# Patient Record
Sex: FEMALE | Race: WHITE | Employment: FULL TIME | URBAN - METROPOLITAN AREA
[De-identification: names, ages, dates, MRNs, and addresses within clinical notes are randomized per-mention and may not be internally consistent; named-entity substitution may affect disease eponyms.]

---

## 2022-07-13 ENCOUNTER — HOSPITAL ENCOUNTER (OUTPATIENT)
Dept: PHYSICAL THERAPY | Age: 67
Discharge: HOME | End: 2022-07-13
Payer: MEDICARE

## 2022-07-13 PROCEDURE — 97161 PT EVAL LOW COMPLEX 20 MIN: CPT

## 2022-07-13 NOTE — PROGRESS NOTES
PHYSICAL THERAPY - DAILY TREATMENT NOTE      Patient Name: Lisa Pena        Date: 2022  : 1955   YES Patient  Verified  Visit #:     Insurance: Payor: Nancy Robbind / Plan: [address] [place] [address] HMO / Product Type: Managed Care Medicare /      In time: 11:45 Out time: 12:25   Total Treatment Time: 40     Medicare Time/BCBS Tracking (below)   Total Timed Codes (min):  0 1:1 Treatment Time:  0     TREATMENT AREA = Other low back pain [M54.59]     SUBJECTIVE    Pain Level (on 0 to 10 scale):  8  / 10   Medication Changes/New allergies or changes in medical history, any new surgeries or procedures? NO    If yes, update Summary List   Subjective Functional Status/Changes:  []  No changes reported       See Plan of Care       OBJECTIVE    Other Objective/Functional Measures:    See Plan of Care     Post Treatment Pain Level (on 0 to 10) scale:   8   10     ASSESSMENT    Assessment/Changes in Function:     See Plan of Care     []  See Progress Note/Recertification   Patient will continue to benefit from skilled PT services to modify and progress therapeutic interventions, address functional mobility deficits, address ROM deficits, address strength deficits, analyze and address soft tissue restrictions, analyze and cue movement patterns, analyze and modify body mechanics/ergonomics and assess and modify postural abnormalities to attain remaining goals. to attain remaining goals.    Progress toward goals / Updated goals:    See Plan of Care     PLAN    [x]  Upgrade activities as tolerated YES Continue plan of care   []  Discharge due to :    []  Other:      Therapist: Callum Easley PT    Date: 2022 Time: 12:46 PM     Future Appointments   Date Time Provider Rik Hill   2022 11:45 AM Mir Tamayo,  [facility]   2022 12:15 PM Mir Tamayo,  [facility]   2022 11:45 AM Mir Tamayo,  [facility]   2022 12:45 PM Mir Tamayo,  [facility]

## 2022-07-13 NOTE — PROGRESS NOTES
[address],[facility], [address] - Phone: [phone]  Fax: [phone] / [facility]  Patient Name: Kaila Simmons : 1955   Medical   Diagnosis: Other low back pain [M54.59] Treatment Diagnosis: LBP   Onset Date:      Referral Source: Dane Paul NP Start of Care [facility]): 2022   Prior Hospitalization: See medical history Provider #: [id number]   Prior Level of Function: Hx chronic LBP   Comorbidities: Depression, OA   Medications: Verified on Patient Summary List   The Plan of Care and following information is based on the information from the initial evaluation.   ===========================================================================================  Assessment / nunn information:  Kaila Simmons is a 78 y.o.  yo female with Dx of Other low back pain [M54.59]. Patient reports increased symptoms about one year ago with history of chronic back pain. Patient reporting increased pain and right LE radicular pain over the last week with need for [facility] use to assist in ambulation. Patient arrived to clinic with self-prescribed back brace. Reported declining MD recommendation of lumbar GUERITA last year. Patient currently rates pain as 8/10 at worst, 8/10 at best, primarily located at lumbar and right LE. Patient complains of difficulty and increase pain with standing, walking and bending/lifting. 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 Objective Findings:  Lumbar AROM: flexion decreased 50%, extension decreased 50%, lateral flexion right decreased 50%, left decreased 50%, rotation right decreased 50%, left decreased 50% with pain at end range all planes. Manual Muscle Testin-/5 right hip abduction and bilateral hip extension.   Special Test: signs and symptoms consistent with spinal nerve compression. Scoliotic curvature and poor spinal/pelvic alignment noted. Poor posture and core engagement noted. FOTO Score= 53 points. Patient educated in activity modification suggestions to avoid symptom exacerbation. Patient will benefit from continued skilled services to further improve functional limitations.  ===========================================================================================  Eval Complexity: History HIGH Complexity :3+ comorbidities / personal factors will impact the outcome/ POC ;  Examination  HIGH Complexity : 4+ Standardized tests and measures addressing body structure, function, activity limitation and / or participation in recreation ; Presentation LOW Complexity : Stable, uncomplicated ;  Decision Making MEDIUM Complexity : FOTO score of 26-74; Overall Complexity LOW   Problem List: pain affecting function, decrease ROM, decrease strength, impaired gait/ balance, decrease ADL/ functional abilitiies, decrease activity tolerance and decrease flexibility/ joint mobility   Treatment Plan may include any combination of the following: Therapeutic exercise, Therapeutic activities, Neuromuscular re-education, Physical agent/modality, Manual therapy and Patient education  Patient / Family readiness to learn indicated by: asking questions, trying to perform skills and interest  Persons(s) to be included in education: patient (P)  Barriers to Learning/Limitations: None  Measures taken, if barriers to learning:    Patient Goal (s): \"Make my pain more manageable. \"   Patient self reported health status: fair  Rehabilitation Potential: good   Short Term Goals: To be accomplished in  2  weeks:  1. Patient will increase FOTO Score to 56 points to improve tolerance to ADLs. 2. Patient will achieve +2 on GROC to improve tolerance to ADLs. 3. Patient will report 5/10 pain at worst to improve tolerance to ADLs.  Long Term Goals:  To be accomplished in  4 weeks: 1. Patient will increase FOTO Score to 59 points to improve tolerance to ADLs. 2. Patient will achieve +4 on GROC to improve tolerance to ADLs. 3. Patient will report 3/10 pain at worst to improve tolerance to ADLs. Frequency / Duration:   Patient to be seen  1-2  times per week for 4  weeks:  Patient / Caregiver education and instruction: activity modification  Therapist Signature: Scar Mackey PT Date: 2/86/3063   Certification Period: 7/13/22-10/12/22 Time: 12:46 PM   ===========================================================================================  I certify that the above Physical Therapy Services are being furnished while the patient is under my care. I agree with the treatment plan and certify that this therapy is necessary. Physician Signature:        Date:       Time:                                        Sweta Hartley NP  Please sign and return to [facility], [facility]. or you may fax the signed copy to [phone]. Thank you.

## 2022-07-20 ENCOUNTER — APPOINTMENT (OUTPATIENT)
Dept: PHYSICAL THERAPY | Age: 67
End: 2022-07-20
Payer: MEDICARE

## 2022-07-29 ENCOUNTER — HOSPITAL ENCOUNTER (OUTPATIENT)
Dept: PHYSICAL THERAPY | Age: 67
Discharge: HOME | End: 2022-07-29
Payer: MEDICARE

## 2022-07-29 PROCEDURE — 97140 MANUAL THERAPY 1/> REGIONS: CPT

## 2022-07-29 PROCEDURE — 97110 THERAPEUTIC EXERCISES: CPT

## 2022-07-29 PROCEDURE — 97530 THERAPEUTIC ACTIVITIES: CPT

## 2022-07-29 NOTE — PROGRESS NOTES
PHYSICAL THERAPY - DAILY TREATMENT NOTE      Patient Name: Andria Gutierrez        Date: 2022  : 1955   YES Patient  Verified  Visit #:     Insurance: Payor: Christine Natasha / Plan: [address] [place] [address] HMO / Product Type: Managed Care Medicare /      In time: 12:20 Out time: 1:10   Total Treatment Time: 50     Medicare/BCBS Time Tracking (below)   Total Timed Codes (min):  40 1:1 Treatment Time:  40     TREATMENT AREA =  Other low back pain [M54.59]    SUBJECTIVE    Pain Level (on 0 to 10 scale):  5  / 10   Medication Changes/New allergies or changes in medical history, any new surgeries or procedures?     NO    If yes, update Summary List   Subjective Functional Status/Changes:  []  No changes reported       Functional improvements: none reported  Functional impairments: left LE radicular with ADLs         OBJECTIVE  Modalities Rationale:     decrease pain to improve patient's ability to perform ADLs   min [] Estim, type/location:                                      []  att     []  unatt     []  w/US     []  w/ice    []  w/heat    min []  Mechanical Traction: type/lbs                   []  pro   []  sup   []  int   []  cont    []  before manual    []  after manual    min []  Ultrasound, settings/location:      min []  Iontophoresis w/ dexamethasone, location:                                               []  take home patch       []  in clinic   10 min []  Ice     [x]  Heat    location/position: Supine lumbar    min []  Vasopneumatic Device, press/temp:        min []  Other:    [x] Skin assessment post-treatment (if applicable):    []  intact    [x]  redness- no adverse reaction                  []redness - adverse reaction:      10 min Therapeutic Exercise:  [x]  See flow sheet   Rationale:      increase ROM and increase strength to improve the patients ability to perform ADLs     15 min Therapeutic Activity: Posture and lifting education   Rationale:      increase ROM and increase strength to improve the patients ability to perform ADLs     15 min Manual Therapy: Technique:      [] S/DTM []IASTM []PROM [] Passive Stretching   []manual TPR    []Jt manipulation:Gr I [] II []  III [] IV[] V[]  Treatment Area:  S/L Left lateral hip; supine bilateral psoas release; prone with pillows lumbar DTM bilateral and sacral flexion mobs   Rationale:      decrease pain, increase ROM, and increase tissue extensibility to improve patient's ability to perform ADLs    Billed With/As:   [] TE   [] TA   [] Neuro   [] Self Care Patient Education: [x] Review HEP    [] Progressed/Changed HEP based on:   [] positioning   [] body mechanics   [] transfers   [] heat/ice application    [] other:      Other Objective/Functional Measures:    Reviewed posture, body mechanics and lifting techniques to reduce load on spine. Post Treatment Pain Level (on 0 to 10) scale:   5  / 10     ASSESSMENT    Assessment/Changes in Function:     Patient encouraged to initiate HEP of prone with pillows GS to reduce radiculopathy during ADLs. []  See Progress Note/Recertification   Patient will continue to benefit from skilled PT services to modify and progress therapeutic interventions, address functional mobility deficits, address ROM deficits, address strength deficits, analyze and address soft tissue restrictions, analyze and cue movement patterns, analyze and modify body mechanics/ergonomics, and assess and modify postural abnormalities to attain remaining goals. to attain remaining goals. Progress toward goals / Updated goals:    Slow Progress to    [] STG    [x] LTG  3 as shown by pain 5/10.      PLAN    [x]  Upgrade activities as tolerated YES Continue plan of care   []  Discharge due to :    []  Other:      Therapist: Carlos Barrios PT    Date: 7/29/2022 Time: 1:05 PM     Future Appointments   Date Time Provider Rik Hill   8/3/2022 11:45 AM Arun Gtz PT [facility]   8/22/2022 12:45 PM Arun Gtz, PT [facility]

## 2022-08-01 ENCOUNTER — APPOINTMENT (OUTPATIENT)
Dept: PHYSICAL THERAPY | Age: 67
End: 2022-08-01
Payer: MEDICARE

## 2022-08-03 ENCOUNTER — TELEPHONE (OUTPATIENT)
Dept: PHYSICAL THERAPY | Age: 67
End: 2022-08-03

## 2022-08-03 ENCOUNTER — APPOINTMENT (OUTPATIENT)
Dept: PHYSICAL THERAPY | Age: 67
End: 2022-08-03
Payer: MEDICARE

## 2022-08-22 ENCOUNTER — HOSPITAL ENCOUNTER (OUTPATIENT)
Dept: PHYSICAL THERAPY | Age: 67
Discharge: HOME | End: 2022-08-22
Payer: MEDICARE

## 2022-08-22 PROCEDURE — 97140 MANUAL THERAPY 1/> REGIONS: CPT

## 2022-08-22 PROCEDURE — 97110 THERAPEUTIC EXERCISES: CPT

## 2022-08-22 NOTE — PROGRESS NOTES
[address],[place], [address] [facility] - Phone: [phone]  Fax: [phone] OF CARE/RECERTIFICATION FOR PHYSICAL THERAPY          Patient Name: Carolina Patel : 1955   Treatment/Medical Diagnosis: Other low back pain [M54.59]   Onset Date:     Referral Source: Jean Patel NP Start of Care [facility] [place]): 22   Prior Hospitalization: See Medical History Provider #: [id number]   Prior Level of Function: Hx chronic LBP   Comorbidities: Depression, OA   Medications: Verified on Patient Summary List   Visits from [facility]: 3 Missed Visits: 3     Goal/Measure of Progress Goal Met? 1. Patient will increase FOTO Score to 59 points to improve tolerance to ADLs. Status at last Eval: 53 points Current Status: Not assessed Not assessed   2. Patient will achieve +4 on GROC to improve tolerance to ADLs. Status at last Eval: N/A Current Status: GROC 0 no   3. Patient will report 3/10 pain at worst to improve tolerance to ADLs. Status at last Eval: Pain 8/10 at worst Current Status: Pain 8/10 at worst no     Key Functional Changes/Progress: Over last 3 session, patient has been educated in proper body mechanics and lifting strategies to reduce pain and patient reported some carry through at home. Patient reports continuing to take pain medication to manage symptoms. Patient states no significant improvement noted, however only attended initial eval and 2 follow up sessions. Plan to continue to address soft tissue increased tone, poor joint mobility and alignment and progress therex to improve spinal mobility and stability.   Problem List: pain affecting function, decrease ROM, decrease strength, decrease ADL/ functional abilitiies, decrease activity tolerance, and decrease flexibility/ joint mobility   Treatment Plan may include any combination of the following: Therapeutic exercise, Therapeutic activities, Neuromuscular re-education, Physical agent/modality, Manual therapy, and Patient education  Patient Goal(s) has been updated and includes:     Goals for this certification period include and are to be achieved in   4  weeks:  1. Patient will increase FOTO Score to 59 points to improve tolerance to ADLs. 2. Patient will achieve +4 on GROC to improve tolerance to ADLs. 3. Patient will report 3/10 pain at worst to improve tolerance to ADLs. Frequency / Duration:   Patient to be seen   2   times per week for   4    weeks:    Assessments/Recommendations: Patient would benefit from continued PT services to further improve tolerance to ADLs and as care providers. If you have any questions/comments please contact us directly at [phone]. Thank you for allowing us to assist in the care of your patient. Therapist Signature: Higinio Cuellar PT Date: 7/03/8671   Certification Period:  Reporting Period: 7/13/22-10/12/22  7/13/22-8/22/22 Time: 12:57 PM   NOTE TO PHYSICIAN:  PLEASE COMPLETE THE ORDERS BELOW AND FAX TO   [facility]: [phone]  If you are unable to process this request in 24 hours please contact our office: [phone]    ___ I have read the above report and request that my patient continue as recommended.   ___ I have read the above report and request that my patient continue therapy with the following changes/special instructions: ________________________________________________   ___ I have read the above report and request that my patient be discharged from therapy.      Physician Signature:        Date:       Time:                                       Melba Logan NP

## 2022-08-22 NOTE — PROGRESS NOTES
Note, I also left message for Adriane QUIÑONES to change date from 8/20 to 9/10   PHYSICAL THERAPY - DAILY TREATMENT NOTE      Patient Name: Leonardo Loza        Date: 2022  : 1955   YES Patient  Verified  Visit #:   3   of   12  Insurance: Payor: Amanda Knight / Plan: [address] [place] [address] HMO / Product Type: Managed Care Medicare /      In time: 12:55 Out time: 1:45   Total Treatment Time: 50     Medicare/BCBS Time Tracking (below)   Total Timed Codes (min):  50 1:1 Treatment Time:  50     TREATMENT AREA =  Other low back pain [M54.59]    SUBJECTIVE    Pain Level (on 0 to 10 scale):  6  / 10   Medication Changes/New allergies or changes in medical history, any new surgeries or procedures?     NO    If yes, update Summary List   Subjective Functional Status/Changes:  []  No changes reported       See Recert         OBJECTIVE    25 min Therapeutic Exercise:  [x]  See flow sheet   Rationale:      increase ROM and increase strength to improve the patients ability to perform ADLs     25 min Manual Therapy: Technique:      [] S/DTM []IASTM []PROM [] Passive Stretching   []manual TPR    []Jt manipulation:Gr I [] II []  III [] IV[] V[]  Treatment Area:  S/L DTM lumbar and post/lat hip; prone DTM lumbar and sacral flexion mobs grade IV   Rationale:      decrease pain, increase ROM, and increase tissue extensibility to improve patient's ability to perform ADLs    Billed With/As:   [x] TE   [] TA   [] Neuro   [] Self Care Patient Education: [x] Review HEP    [] Progressed/Changed HEP based on:   [] positioning   [] body mechanics   [] transfers   [] heat/ice application    [] other:      Other Objective/Functional Measures:    See Recert     Post Treatment Pain Level (on 0 to 10) scale:   4  / 10     ASSESSMENT    Assessment/Changes in Function:     See Recert     []  See Progress Note/Recertification   Patient will continue to benefit from skilled PT services to modify and progress therapeutic interventions, address functional mobility deficits, address ROM deficits, address strength deficits, analyze and address soft tissue restrictions, analyze and cue movement patterns, analyze and modify body mechanics/ergonomics, and assess and modify postural abnormalities to attain remaining goals. to attain remaining goals.    Progress toward goals / Updated goals:    See Recert     PLAN    [x]  Upgrade activities as tolerated YES Continue plan of care   []  Discharge due to :    []  Other:      Therapist: Sarah Cisneros PT    Date: 8/22/2022 Time: 3:25 PM     Future Appointments   Date Time Provider Rik Hill   8/29/2022  1:30 PM [facility]

## 2022-08-29 ENCOUNTER — HOSPITAL ENCOUNTER (OUTPATIENT)
Dept: PHYSICAL THERAPY | Age: 67
Discharge: HOME | End: 2022-08-29
Payer: MEDICARE

## 2022-08-29 PROCEDURE — 97110 THERAPEUTIC EXERCISES: CPT

## 2022-08-29 PROCEDURE — 97140 MANUAL THERAPY 1/> REGIONS: CPT

## 2022-08-29 NOTE — PROGRESS NOTES
PHYSICAL THERAPY - DAILY TREATMENT NOTE      Patient Name: Reynaldo Rodriguez        Date: 2022  : 1955   YES Patient  Verified  Visit #:     Insurance: Payor: Chase Eastman / Plan: [address] [place] [address] HMO / Product Type: Managed Care Medicare /      In time: 1:35 Out time: 2:25   Total Treatment Time: 50     Medicare/BCBS Time Tracking (below)   Total Timed Codes (min):  50 1:1 Treatment Time:  50     TREATMENT AREA =  Other low back pain [M54.59]    SUBJECTIVE    Pain Level (on 0 to 10 scale):  6  / 10   Medication Changes/New allergies or changes in medical history, any new surgeries or procedures?     NO    If yes, update Summary List   Subjective Functional Status/Changes:  []  No changes reported       Functional improvements: less pain with ADLs  Functional impairments: bending/lifting         OBJECTIVE  Modalities Rationale:     decrease pain to improve patient's ability to perform ADLs   min [] Estim, type/location:                                      []  att     []  unatt     []  w/US     []  w/ice    []  w/heat    min []  Mechanical Traction: type/lbs                   []  pro   []  sup   []  int   []  cont    []  before manual    []  after manual    min []  Ultrasound, settings/location:      min []  Iontophoresis w/ dexamethasone, location:                                               []  take home patch       []  in clinic   10 min []  Ice     [x]  Heat    location/position: Supine lumbar    min []  Vasopneumatic Device, press/temp:        min []  Other:    [x] Skin assessment post-treatment (if applicable):    []  intact    [x]  redness- no adverse reaction                  []redness - adverse reaction:      25 min Therapeutic Exercise:  [x]  See flow sheet   Rationale:      increase ROM and increase strength to improve the patients ability to perform ADLs     15 min Manual Therapy: Technique:      [] S/DTM []IASTM []PROM [] Passive Stretching   []manual TPR    []Jt manipulation:Gr I [] II []  III [] IV[] V[]  Treatment Area:  DTM lumbar paraspinals, post/lat hip' prone TL junction release, sacral flexion mobs grade IV   Rationale:      decrease pain, increase ROM, and increase tissue extensibility to improve patient's ability to perform ADLs    Billed With/As:   [x] TE   [] TA   [] Neuro   [] Self Care Patient Education: [x] Review HEP    [] Progressed/Changed HEP based on:   [] positioning   [] body mechanics   [] transfers   [] heat/ice application    [] other:      Other Objective/Functional Measures:    Patient tolerating therex without exacerbation of symptoms. Post Treatment Pain Level (on 0 to 10) scale:   4  / 10     ASSESSMENT    Assessment/Changes in Function:     Patient continues to report intermittent left>right radiculopathy. []  See Progress Note/Recertification   Patient will continue to benefit from skilled PT services to modify and progress therapeutic interventions, address functional mobility deficits, address ROM deficits, address strength deficits, analyze and address soft tissue restrictions, analyze and cue movement patterns, and analyze and modify body mechanics/ergonomics to attain remaining goals. to attain remaining goals. Progress toward goals / Updated goals:    Fair Progress to    [] STG    [x] LTG  3 as shown by pain 6/10. PLAN    [x]  Upgrade activities as tolerated YES Continue plan of care   []  Discharge due to :    []  Other:      Therapist: Erica Alcala, PT    Date: 8/29/2022 Time: 2:00 PM   No future appointments.

## 2022-09-14 ENCOUNTER — HOSPITAL ENCOUNTER (OUTPATIENT)
Dept: PHYSICAL THERAPY | Age: 67
Discharge: HOME | End: 2022-09-14
Payer: MEDICARE

## 2022-09-14 PROCEDURE — 97110 THERAPEUTIC EXERCISES: CPT

## 2022-09-14 PROCEDURE — 97140 MANUAL THERAPY 1/> REGIONS: CPT

## 2022-09-14 NOTE — PROGRESS NOTES
PHYSICAL THERAPY - DAILY TREATMENT NOTE      Patient Name: Stu Capellan        Date: 2022  : 1955   YES Patient  Verified  Visit #:     Insurance: Payor: Emerson Proffer / Plan: [address] [place] [address] HMO / Product Type: Managed Care Medicare /      In time: 11:00 Out time: 11:50   Total Treatment Time: 50     Medicare/BCBS Time Tracking (below)   Total Timed Codes (min):  40 1:1 Treatment Time:  40     TREATMENT AREA =  Other low back pain [M54.59]    SUBJECTIVE    Pain Level (on 0 to 10 scale):  6  / 10   Medication Changes/New allergies or changes in medical history, any new surgeries or procedures?     NO    If yes, update Summary List   Subjective Functional Status/Changes:  []  No changes reported       Functional improvements: less pain with ADLs  Functional impairments: bending/lifting         OBJECTIVE  Modalities Rationale:     decrease pain to improve patient's ability to perform ADLs   min [] Estim, type/location:                                      []  att     []  unatt     []  w/US     []  w/ice    []  w/heat    min []  Mechanical Traction: type/lbs                   []  pro   []  sup   []  int   []  cont    []  before manual    []  after manual    min []  Ultrasound, settings/location:      min []  Iontophoresis w/ dexamethasone, location:                                               []  take home patch       []  in clinic   10 min []  Ice     [x]  Heat    location/position: Supine lumbar    min []  Vasopneumatic Device, press/temp:        min []  Other:    [x] Skin assessment post-treatment (if applicable):    []  intact    [x]  redness- no adverse reaction                  []redness - adverse reaction:      15 min Therapeutic Exercise:  [x]  See flow sheet   Rationale:      increase ROM and increase strength to improve the patients ability to perform ADLs     25 min Manual Therapy: Technique:      [] S/DTM []IASTM []PROM [] Passive Stretching   []manual TPR    []Jt manipulation:Gr I [] II []  III [] IV[] V[]  Treatment Area:  S/L DTM lumbar and post/lat hip; prone DTM lumbar and sacral flexion mobs grade III   Rationale:      decrease pain, increase ROM, and increase tissue extensibility to improve patient's ability to perform ADLs    Billed With/As:   [] TE   [] TA   [] Neuro   [] Self Care Patient Education: [x] Review HEP    [] Progressed/Changed HEP based on:   [] positioning   [] body mechanics   [] transfers   [] heat/ice application    [] other:      Other Objective/Functional Measures:    Patient tolerated therex progression without complaints of increased pain. Post Treatment Pain Level (on 0 to 10) scale:   4  / 10     ASSESSMENT    Assessment/Changes in Function:     Updated and reviewed HEP. Patient encouraged to increase compliance with daily home program and activity modification suggestions. []  See Progress Note/Recertification   Patient will continue to benefit from skilled PT services to modify and progress therapeutic interventions, address functional mobility deficits, address ROM deficits, address strength deficits, analyze and address soft tissue restrictions, analyze and cue movement patterns, analyze and modify body mechanics/ergonomics, and assess and modify postural abnormalities to attain remaining goals. to attain remaining goals. Progress toward goals / Updated goals:    Fair Progress to    [] STG    [x] LTG  3 as shown by pain 6/10.      PLAN    [x]  Upgrade activities as tolerated YES Continue plan of care   []  Discharge due to :    []  Other:      Therapist: Scar Mackey PT    Date: 9/14/2022 Time: 11:58 AM     Future Appointments   Date Time Provider Rik Hill   9/19/2022  1:30 PM Aimee Norman PT [facility]   9/26/2022 12:45 PM Aimee Norman PT [facility]

## 2022-09-19 ENCOUNTER — HOSPITAL ENCOUNTER (OUTPATIENT)
Dept: PHYSICAL THERAPY | Age: 67
Discharge: HOME | End: 2022-09-19
Payer: MEDICARE

## 2022-09-19 PROCEDURE — 97110 THERAPEUTIC EXERCISES: CPT

## 2022-09-19 PROCEDURE — 97140 MANUAL THERAPY 1/> REGIONS: CPT

## 2022-09-19 NOTE — PROGRESS NOTES
PHYSICAL THERAPY - DAILY TREATMENT NOTE      Patient Name: Wicho Turner        Date: 2022  : 1955   YES Patient  Verified  Visit #:     Insurance: Payor: Nanette Kat / Plan: [address] [place] [address] HMO / Product Type: Managed Care Medicare /      In time: 1:35 Out time: 2:25   Total Treatment Time: 50     Medicare/BCBS Time Tracking (below)   Total Timed Codes (min):  40 1:1 Treatment Time:  40     TREATMENT AREA =  Other low back pain [M54.59]    SUBJECTIVE    Pain Level (on 0 to 10 scale):  6  / 10   Medication Changes/New allergies or changes in medical history, any new surgeries or procedures?     NO    If yes, update Summary List   Subjective Functional Status/Changes:  []  No changes reported       See Recert         OBJECTIVE  Modalities Rationale:     decrease pain to improve patient's ability to perform ADLs   min [] Estim, type/location:                                      []  att     []  unatt     []  w/US     []  w/ice    []  w/heat    min []  Mechanical Traction: type/lbs                   []  pro   []  sup   []  int   []  cont    []  before manual    []  after manual    min []  Ultrasound, settings/location:      min []  Iontophoresis w/ dexamethasone, location:                                               []  take home patch       []  in clinic   10 min []  Ice     [x]  Heat    location/position: Supine lumbar    min []  Vasopneumatic Device, press/temp:        min []  Other:    [x] Skin assessment post-treatment (if applicable):    []  intact    [x]  redness- no adverse reaction                  []redness - adverse reaction:      25 min Therapeutic Exercise:  [x]  See flow sheet   Rationale:      increase ROM and increase strength to improve the patients ability to perform ADLs     15 min Manual Therapy: Technique:      [] S/DTM []IASTM []PROM [] Passive Stretching   []manual TPR    []Jt manipulation:Gr I [] II []  III [] IV[] V[]  Treatment Area:  S/L DTM lumbar and post/lat hip; prone DTM lumbar and sacral flexion mobs grade IV   Rationale:      decrease pain, increase ROM, and increase tissue extensibility to improve patient's ability to perform ADLs    Billed With/As:   [x] TE   [] TA   [] Neuro   [] Self Care Patient Education: [x] Review HEP    [] Progressed/Changed HEP based on:   [] positioning   [] body mechanics   [] transfers   [] heat/ice application    [] other:      Other Objective/Functional Measures:    See Recert     Post Treatment Pain Level (on 0 to 10) scale:   4 / 10     ASSESSMENT    Assessment/Changes in Function:     See Recert     []  See Progress Note/Recertification   Patient will continue to benefit from skilled PT services to modify and progress therapeutic interventions, address functional mobility deficits, address ROM deficits, address strength deficits, analyze and address soft tissue restrictions, analyze and cue movement patterns, analyze and modify body mechanics/ergonomics, and assess and modify postural abnormalities to attain remaining goals. to attain remaining goals.    Progress toward goals / Updated goals:    See Recert      PLAN    [x]  Upgrade activities as tolerated YES Continue plan of care   []  Discharge due to :    []  Other:      Therapist: Lorenza Corral, PT    Date: 9/19/2022 Time: 2:08 PM     Future Appointments   Date Time Provider Rik Hill   9/26/2022 12:45 PM Jose Amos PT [facility] [id number]

## 2022-09-19 NOTE — PROGRESS NOTES
[address] [place] [facility], [facility] - Phone: [phone]  Fax: [phone] OF CARE/RECERTIFICATION FOR PHYSICAL THERAPY          Patient Name: Mello Christianson : 1955   Treatment/Medical Diagnosis: Other low back pain [M54.59]   Onset Date:     Referral Source: Yonis Kingston NP Start of Care [facility]): 22   Prior Hospitalization: See Medical History Provider #: [id number]   Prior Level of Function: Hx chronic LBP   Comorbidities: Depression, OA   Medications: Verified on Patient Summary List   Visits from [facility]: 6 Missed Visits: 3     Goal/Measure of Progress Goal Met? 1. Patient will increase FOTO Score to 59 points to improve tolerance to ADLs. Status at last Eval: 53 points Current Status: 47 points no   2. Patient will achieve +4 on GROC to improve tolerance to ADLs. Status at last Eval: GROC 0 Current Status: GROC +6 yes   3. Patient will report 3/10 pain at worst to improve tolerance to ADLs. Status at last Eval: Pain 8/10 Current Status: Pain 6/10 no     Key Functional Changes/Progress: Patient reporting pain improved to 6/10 average. No change in standing, walking, bending and lifting tolerance reported. Patient educated in posture, lifting and body mechanics suggestions and reports improved compliance with HEP. Patient continues to don lumbar brace and reports LBP with intermittent bilateral radiculopathy limiting daily function. Plan to progress program to further reduce symptoms. Patient encouraged to follow up with MD in next 4 weeks for further assessment.   Problem List: pain affecting function, decrease ROM, decrease strength, impaired gait/ balance, decrease ADL/ functional abilitiies, decrease activity tolerance, and decrease flexibility/ joint mobility   Treatment Plan may include any combination of the following: Therapeutic exercise, Therapeutic activities, Neuromuscular re-education, Physical agent/modality, and Manual therapy  Patient Goal(s) has been updated and includes:     Goals for this certification period include and are to be achieved in   4  weeks:  1. Patient will increase FOTO Score to 59 points to improve tolerance to ADLs. 2. Patient will report 75% improvement in symptoms to improve tolerance to ADLs. 3. Patient will report 3/10 pain at worst to improve tolerance to ADLs. Frequency / Duration:   Patient to be seen   1-2   times per week for   4    weeks:    Assessments/Recommendations: Recommend continued PT to further improve symptoms of lumbar radiculopathy and increase tolerance to ADLs. If you have any questions/comments please contact us directly at [phone]. Thank you for allowing us to assist in the care of your patient. Therapist Signature: Erica Alcala PT Date: 8/39/3877   Certification Period:  Reporting Period: 7/13/22-10/12/22  8/22/22-9/19/22 Time: 1:42 PM   NOTE TO PHYSICIAN:  PLEASE COMPLETE THE ORDERS BELOW AND FAX TO   [facility]: [phone]  If you are unable to process this request in 24 hours please contact our office: [phone]    ___ I have read the above report and request that my patient continue as recommended.   ___ I have read the above report and request that my patient continue therapy with the following changes/special instructions: ________________________________________________   ___ I have read the above report and request that my patient be discharged from therapy.      Physician Signature:        Date:       Time:                                       Cameron Lang NP

## 2022-09-23 ENCOUNTER — TELEPHONE (OUTPATIENT)
Dept: PHYSICAL THERAPY | Age: 67
End: 2022-09-23

## 2022-09-26 ENCOUNTER — APPOINTMENT (OUTPATIENT)
Dept: PHYSICAL THERAPY | Age: 67
End: 2022-09-26
Payer: MEDICARE

## 2022-10-05 ENCOUNTER — APPOINTMENT (OUTPATIENT)
Dept: PHYSICAL THERAPY | Age: 67
End: 2022-10-05
Payer: MEDICARE

## 2022-10-14 ENCOUNTER — HOSPITAL ENCOUNTER (OUTPATIENT)
Dept: PHYSICAL THERAPY | Age: 67
Discharge: HOME | End: 2022-10-14
Payer: MEDICARE

## 2022-10-14 PROCEDURE — 97140 MANUAL THERAPY 1/> REGIONS: CPT

## 2022-10-14 PROCEDURE — 97110 THERAPEUTIC EXERCISES: CPT

## 2022-10-14 NOTE — PROGRESS NOTES
PHYSICAL THERAPY - DAILY TREATMENT NOTE      Patient Name: Rima Perdue        Date: 10/14/2022  : 1955   YES Patient  Verified  Visit #:     Insurance: Payor: Karina Bautista / Plan: [address] [place] [address] HMO / Product Type: Managed Care Medicare /      In time: 10:40 Out time: 11:30   Total Treatment Time: 50     Medicare/BCBS Time Tracking (below)   Total Timed Codes (min):  40 1:1 Treatment Time:  40     TREATMENT AREA =  Other low back pain [M54.59]    SUBJECTIVE    Pain Level (on 0 to 10 scale):  7  / 10   Medication Changes/New allergies or changes in medical history, any new surgeries or procedures?     NO    If yes, update Summary List   Subjective Functional Status/Changes:  []  No changes reported       See Recert         OBJECTIVE  Modalities Rationale:     decrease pain to improve patient's ability to perform ADLs   min [] Estim, type/location:                                      []  att     []  unatt     []  w/US     []  w/ice    []  w/heat    min []  Mechanical Traction: type/lbs                   []  pro   []  sup   []  int   []  cont    []  before manual    []  after manual    min []  Ultrasound, settings/location:      min []  Iontophoresis w/ dexamethasone, location:                                               []  take home patch       []  in clinic   10 min []  Ice     [x]  Heat    location/position: Supine lumbar    min []  Vasopneumatic Device, press/temp:        min []  Other:    [x] Skin assessment post-treatment (if applicable):    []  intact    [x]  redness- no adverse reaction                  []redness - adverse reaction:      25 min Therapeutic Exercise:  [x]  See flow sheet   Rationale:      increase ROM and increase strength to improve the patients ability to perform ADLs     15 min Manual Therapy: Technique:      [] S/DTM []IASTM []PROM [] Passive Stretching   []manual TPR    []Jt manipulation:Gr I [] II []  III [] IV[] V[]  Treatment Area:  S/L bilateral DTM lumbar and post/lat hip   Rationale:      decrease pain, increase ROM, and increase tissue extensibility to improve patient's ability to perform ADLs    Billed With/As:   [] TE   [] TA   [] Neuro   [] Self Care Patient Education: [x] Review HEP    [] Progressed/Changed HEP based on:   [] positioning   [] body mechanics   [] transfers   [] heat/ice application    [] other:      Other Objective/Functional Measures:    See Recert     Post Treatment Pain Level (on 0 to 10) scale:   6 / 10     ASSESSMENT    Assessment/Changes in Function:     See Recert. Patient on hold from PT 9/19/22-10/14/22 due to positive Covid test on 10/1/22. []  See Progress Note/Recertification   Patient will continue to benefit from skilled PT services to modify and progress therapeutic interventions, address functional mobility deficits, address ROM deficits, address strength deficits, analyze and address soft tissue restrictions, analyze and cue movement patterns, analyze and modify body mechanics/ergonomics, and assess and modify postural abnormalities to attain remaining goals. to attain remaining goals.    Progress toward goals / Updated goals:    See Recert     PLAN    [x]  Upgrade activities as tolerated YES Continue plan of care   []  Discharge due to :    []  Other:      Therapist: Quinten Mcgee PT    Date: 10/14/2022 Time: 10:46 AM     Future Appointments   Date Time Provider Rik Hill   10/19/2022 12:15 PM Chrits Dowell, PT [address] [address]

## 2022-10-14 NOTE — THERAPY RECERTIFICATION
[address],[place], [address] [facility] - Phone: [phone]  Fax: [phone] OF CARE/RECERTIFICATION FOR PHYSICAL THERAPY          Patient Name: Tran Tobar : 1955   Treatment/Medical Diagnosis: Other low back pain [M54.59]   Onset Date:     Referral Source: Josiah Prasad NP Start of Care [facility]): 22   Prior Hospitalization: See Medical History Provider #: [id number]   Prior Level of Function: Hx chronic LBP   Comorbidities: Depression, OA   Medications: Verified on Patient Summary List   Visits from [facility]: 7 Missed Visits: 4     Goal/Measure of Progress Goal Met? 1. Patient will increase FOTO Score to 59 points to improve tolerance to ADLs. Status at last Eval: 47 points Current Status: Not assessed n/a   2. Patient will report 75% improvement in symptoms to improve tolerance to ADLs. Status at last Eval: 25% improved Current Status: No improvement due to illness no   3. Patient will report 3/10 pain at worst to improve tolerance to ADLs. Status at last Eval: 6/10 pain at worst Current Status: 7/10 pain at worst no     Key Functional Changes/Progress: Patient reports average pain 7/10 over last month, in lumbar and right>left LE, However patient contracted Covid on 10/1/22 and on hold from PT and decreased activity level due to illness. Plan to resume PT and home program now that patient is recovered from illness.   Problem List: pain affecting function, decrease ROM, decrease strength, edema affecting function, impaired gait/ balance, decrease ADL/ functional abilitiies, decrease activity tolerance, and decrease flexibility/ joint mobility   Treatment Plan may include any combination of the following: Therapeutic exercise, Neuromuscular reeducation, Manual therapy, Therapeutic activity, and Self care/home management  Patient Goal(s) has been updated and includes:     Goals for this certification period include and are to be achieved in   4  weeks:  1. Patient will increase FOTO Score to 59 points to improve tolerance to ADLs. 2. Patient will report 75% improvement in symptoms to improve tolerance to ADLs. 3. Patient will report 3/10 pain at worst to improve tolerance to ADLs. Frequency / Duration:   Patient to be seen   2   times per week for   2-4    weeks:    Assessments/Recommendations: Recommend continued PT post Covid infection to resume plan of care and further reduce symptoms of lumbar radiculopathy. If you have any questions/comments please contact us directly at [phone]. Thank you for allowing us to assist in the care of your patient. Therapist Signature: Zack Quarles PT Date: 22/64/4096   Certification Period:  Reporting Period: 7/13/22-10/12/22  9/19/22-10/14/22 Time: 10:48 AM   NOTE TO PHYSICIAN:  PLEASE COMPLETE THE ORDERS BELOW AND FAX TO   [facility] [place] [facility]: [phone]  If you are unable to process this request in 24 hours please contact our office: [phone]    ___ I have read the above report and request that my patient continue as recommended.   ___ I have read the above report and request that my patient continue therapy with the following changes/special instructions: ________________________________________________   ___ I have read the above report and request that my patient be discharged from therapy.      Physician Signature:        Date:       Time:                                       Lesley Kwan NP

## 2022-10-19 ENCOUNTER — APPOINTMENT (OUTPATIENT)
Dept: PHYSICAL THERAPY | Age: 67
End: 2022-10-19
Payer: MEDICARE

## 2022-10-19 ENCOUNTER — HOSPITAL ENCOUNTER (OUTPATIENT)
Dept: PHYSICAL THERAPY | Age: 67
Discharge: HOME | End: 2022-10-19
Payer: MEDICARE

## 2022-10-19 PROCEDURE — 97110 THERAPEUTIC EXERCISES: CPT

## 2022-10-19 PROCEDURE — 97140 MANUAL THERAPY 1/> REGIONS: CPT

## 2022-10-19 NOTE — PROGRESS NOTES
PHYSICAL THERAPY - DAILY TREATMENT NOTE      Patient Name: Ethan Arguello        Date: 10/19/2022  : 1955   YES Patient  Verified  Visit #:     Insurance: Payor: Trenton Liu / Plan: [address] [place] [address] HMO / Product Type: Managed Care Medicare /      In time: 12:15 Out time: 1:05   Total Treatment Time: 50     Medicare/BCBS Time Tracking (below)   Total Timed Codes (min):  40 1:1 Treatment Time:  40     TREATMENT AREA =  Other low back pain [M54.59]    SUBJECTIVE    Pain Level (on 0 to 10 scale):  6  / 10   Medication Changes/New allergies or changes in medical history, any new surgeries or procedures?     NO    If yes, update Summary List   Subjective Functional Status/Changes:  []  No changes reported       Functional improvements: less pain with ADLs  Functional impairments: pain with bending/lifting         OBJECTIVE  Modalities Rationale:     decrease pain to improve patient's ability to lift   min [] Estim, type/location:                                      []  att     []  unatt     []  w/US     []  w/ice    []  w/heat    min []  Mechanical Traction: type/lbs                   []  pro   []  sup   []  int   []  cont    []  before manual    []  after manual    min []  Ultrasound, settings/location:      min []  Iontophoresis w/ dexamethasone, location:                                               []  take home patch       []  in clinic   10 min []  Ice     [x]  Heat    location/position: supine    min []  Vasopneumatic Device, press/temp:        min []  Other:    [] Skin assessment post-treatment (if applicable):    []  intact    []  redness- no adverse reaction                  []redness - adverse reaction:      25 min Therapeutic Exercise:  [x]  See flow sheet   Rationale:      increase ROM and increase strength to improve the patients ability to perform ADLs     15 min Manual Therapy: Technique:      [] S/DTM []IASTM []PROM [] Passive Stretching   []manual TPR    []Jt manipulation:Gr I [] II []  III [] IV[] V[]  Treatment Area:  S/L DTM bilateral lumbar and post/lat hip; prone DTM TL junction and flexion sacral mobs grade IV   Rationale:      decrease pain, increase ROM, and increase tissue extensibility to improve patient's ability to perform ADLs    Billed With/As:   [x] TE   [] TA   [] Neuro   [] Self Care Patient Education: [x] Review HEP    [] Progressed/Changed HEP based on:   [] positioning   [] body mechanics   [] transfers   [] heat/ice application    [] other:      Other Objective/Functional Measures:    Patient tolerated therex without symptom exacerbation. Post Treatment Pain Level (on 0 to 10) scale:   6  / 10     ASSESSMENT    Assessment/Changes in Function:     Patient reporting increased compliance with HEP and improved symptom management. []  See Progress Note/Recertification   Patient will continue to benefit from skilled PT services to modify and progress therapeutic interventions, address functional mobility deficits, address ROM deficits, address strength deficits, analyze and address soft tissue restrictions, analyze and cue movement patterns, analyze and modify body mechanics/ergonomics, and assess and modify postural abnormalities to attain remaining goals. to attain remaining goals. Progress toward goals / Updated goals:    Good Progress to    [] STG    [x] LTG  3 as shown by pain 6/10. PLAN    [x]  Upgrade activities as tolerated YES Continue plan of care   []  Discharge due to :    []  Other:      Therapist: Jorgito Artis PT    Date: 10/19/2022 Time: 12:36 PM   No future appointments.

## 2022-10-26 ENCOUNTER — HOSPITAL ENCOUNTER (OUTPATIENT)
Dept: PHYSICAL THERAPY | Age: 67
Discharge: HOME | End: 2022-10-26
Payer: MEDICARE

## 2022-10-26 PROCEDURE — 97140 MANUAL THERAPY 1/> REGIONS: CPT

## 2022-10-26 PROCEDURE — 97110 THERAPEUTIC EXERCISES: CPT

## 2022-10-26 NOTE — PROGRESS NOTES
PHYSICAL THERAPY - DAILY TREATMENT NOTE      Patient Name: Tran Tobar        Date: 10/26/2022  : 1955   YES Patient  Verified  Visit #:     Insurance: Payor: Fausto Leon / Plan: [address] [place] [address] HMO / Product Type: Managed Care Medicare /      In time: 12:15 Out time: 1:05   Total Treatment Time: 50     Medicare/BCBS Time Tracking (below)   Total Timed Codes (min):  40 1:1 Treatment Time:  40     TREATMENT AREA =  Other low back pain [M54.59]    SUBJECTIVE    Pain Level (on 0 to 10 scale):  5  / 10   Medication Changes/New allergies or changes in medical history, any new surgeries or procedures? NO    If yes, update Summary List   Subjective Functional Status/Changes:  []  No changes reported       Functional improvements: less pain with ADLs  Functional impairments: bending/lifting         OBJECTIVE  Modalities Rationale:     decrease pain to improve patient's ability to perform ADLs   min [] Estim, type/location:                                      []  att     []  unatt     []  w/US     []  w/ice    []  w/heat    min []  Mechanical Traction: type/lbs                   []  pro   []  sup   []  int   []  cont    []  before manual    []  after manual    min []  Ultrasound, settings/location:      min []  Iontophoresis w/ dexamethasone, location:                                               []  take home patch       []  in clinic   10 min []  Ice     [x]  Heat    location/position:  Quarles[ine lumbar    min []  Vasopneumatic Device, press/temp:        min []  Other:    [x] Skin assessment post-treatment (if applicable):    [x]  intact    []  redness- no adverse reaction                  []redness - adverse reaction:      25 min Therapeutic Exercise:  [x]  See flow sheet   Rationale:      increase ROM and increase strength to improve the patients ability to perform ADLs     15 min Manual Therapy: Technique:      [] S/DTM []IASTM []PROM [] Passive Stretching   []manual TPR    []Jt manipulation:Gr I [] II []  III [] IV[] V[]  Treatment Area:  bilateral sidelying DTM lumbar nad post/lat hip   Rationale:      decrease pain, increase ROM, and increase tissue extensibility to improve patient's ability to perform ADLs    Billed With/As:   [x] TE   [] TA   [] Neuro   [] Self Care Patient Education: [x] Review HEP    [] Progressed/Changed HEP based on:   [] positioning   [] body mechanics   [] transfers   [] heat/ice application    [] other:      Other Objective/Functional Measures:    Patient reporting reduced pain overall by 40% since beginning PT. Post Treatment Pain Level (on 0 to 10) scale:   5  / 10     ASSESSMENT    Assessment/Changes in Function:     Patient continues to report increased symptoms with bending and lifting. Continuing to encourage improved  compliance with HEP. []  See Progress Note/Recertification   Patient will continue to benefit from skilled PT services to modify and progress therapeutic interventions, address functional mobility deficits, address ROM deficits, address strength deficits, analyze and address soft tissue restrictions, analyze and cue movement patterns, analyze and modify body mechanics/ergonomics, and assess and modify postural abnormalities to attain remaining goals. to attain remaining goals. Progress toward goals / Updated goals:    Fair Progress to    [] STG    [x] LTG  3 as shown by pain 5/10. PLAN    [x]  Upgrade activities as tolerated YES Continue plan of care   []  Discharge due to :    []  Other:      Therapist: Yadira Ty PT    Date: 10/26/2022 Time: 12:39 PM   No future appointments.

## 2022-11-10 ENCOUNTER — HOSPITAL ENCOUNTER (OUTPATIENT)
Dept: PHYSICAL THERAPY | Age: 67
Discharge: HOME | End: 2022-11-10
Payer: MEDICARE

## 2022-11-10 PROCEDURE — 97110 THERAPEUTIC EXERCISES: CPT

## 2022-11-10 PROCEDURE — 97535 SELF CARE MNGMENT TRAINING: CPT

## 2022-11-10 NOTE — THERAPY RECERTIFICATION
[address], [facility] [address],[facility], [address] [facility] - Phone: [phone]  Fax: [phone]  DISCHARGE SUMMARY FOR PHYSICAL THERAPY          Patient Name: Melony Corbett : 1955   Treatment/Medical Diagnosis: Other low back pain [M54.59]   Onset Date:     Referral Source: Maurizio Kovacs NP Start of Care [facility]): 22   Prior Hospitalization: See Medical History Provider #: [id number]   Prior Level of Function: Hx chronic LBP   Comorbidities: Depression, OA   Medications: Verified on Patient Summary List   Visits from [facility]: 10 Missed Visits: 5     Goal/Measure of Progress Goal Met? 1. Patient will increase FOTO Score to 59 points to improve tolerance to ADLs. Status at last Eval: 47 points Current Status: 46 no   2. Patient will report 75% improvement in symptoms to improve tolerance to ADLs. Status at last Eval: 25% improved Current Status: 0% no   3. Patient will report 3/10 pain at worst to improve tolerance to ADLs. Status at last Eval: 6/10 pain at worst Current Status: 7/10 pain at worst no     Key Functional Changes/Progress: Patient has attended 3 sessions since last PN on 10/14/22. Reports continued average pain 6-7/10 over last month, in lumbar and right>left LE. Pt is only able to attend PT 1x every 2 weeks due to high co-pay, therefore consistency and progression is difficult. Pt has been educated in lumbar brace wear time, core stability exercises, posture, and body mechanics, however continues to utilize brace 90% of the time and demonstrates poor mechanics with most movements. She has also now using a quad cane. She reports she is completing her HEP daily and will continue with this now that she is Dc'd. Assessments/Recommendations: DC pt at this time due to not progressing towards goals. She may benefit from a referral to specialist for additional imaging and/or injections for pain control.   If you have any questions/comments please contact us directly at [phone]. Thank you for allowing us to assist in the care of your patient. Therapist Signature: Naldo Loya PT, DPT Date: 11/10/2022   Reporting Period: 10/14/22 - 11/10/22 Time: 10:48 AM   NOTE TO PHYSICIAN:  PLEASE COMPLETE THE ORDERS BELOW AND FAX TO   [facility]: [phone]  If you are unable to process this request in 24 hours please contact our office: [phone]    ___ I have read the above report and request that my patient continue as recommended.   ___ I have read the above report and request that my patient continue therapy with the following changes/special instructions: ________________________________________________   ___ I have read the above report and request that my patient be discharged from therapy.      Physician Signature:        Date:       Time:                                       Sunny Red NP

## 2022-11-10 NOTE — PROGRESS NOTES
PHYSICAL THERAPY - DAILY TREATMENT NOTE      Patient Name: Gilles Rayo        Date: 11/10/2022  : 1955   YES Patient  Verified  Visit #:   10   of   15  Insurance: Payor: Haroldo Claros / Plan: [address] [place] [address] HMO / Product Type: Managed Care Medicare /      In time: 11:29 Out time: 12:07   Total Treatment Time: 38     Medicare/[facility] Time Tracking (below)   Total Timed Codes (min):  38 1:1 Treatment Time:  38     TREATMENT AREA =  Other low back pain [M54.59]    SUBJECTIVE  Pain Level (on 0 to 10 scale):  5-6  / 10   Medication Changes/New allergies or changes in medical history, any new surgeries or procedures? NO    If yes, update Summary List   Subjective Functional Status/Changes:  []  No changes reported     No better and no worse with PT. Pt wondering if it's doing any good. She was hoping to see more improvements since she has been coming since July. OBJECTIVE  13 min Therapeutic Exercise:  [x]  See flow sheet   Rationale:      increase ROM and increase strength to improve the patients ability to perform ADLs     25 min Self Care: Long discussion about POC, performing HEP, body mechanics, posture, lumbar brace wearing time, DC instructions, benefit from following up with referring provider and potential for referral to specialist.   Rationale:     Pt education  to improve the patients ability to understand condition.     Billed With/As:   [] TE   [] TA   [] Neuro   [x] Self Care Patient Education: [x] Review HEP    [] Progressed/Changed HEP based on:   [] positioning   [] body mechanics   [] transfers   [] heat/ice application    [x] other: See above     Other Objective/Functional Measures:    See DC     Post Treatment Pain Level (on 0 to 10) scale:   7  / 10     ASSESSMENT  Assessment/Changes in Function:     See DC     [x]  See Progress Note/Recertification   Patient will continue to benefit from skilled PT services to modify and progress therapeutic interventions, address functional mobility deficits, address ROM deficits, address strength deficits, analyze and address soft tissue restrictions, analyze and cue movement patterns, analyze and modify body mechanics/ergonomics, and assess and modify postural abnormalities to attain remaining goals. to attain remaining goals. Progress toward goals / Updated goals:    See DC     PLAN  []  Upgrade activities as tolerated no Continue plan of care   [x]  Discharge due to : No progress towards goals.    []  Other:      Therapist: Aayush Mao PT, DPT    Date: 11/10/2022 Time: 12:39 PM     Future Appointments   Date Time Provider [place]   11/10/2022 11:30 AM SANDRA Clement 4864

## 2024-03-04 ENCOUNTER — HOSPITAL ENCOUNTER (OUTPATIENT)
Facility: HOSPITAL | Age: 69
Setting detail: RECURRING SERIES
Discharge: HOME | End: 2024-03-07
Payer: MEDICARE

## 2024-03-04 PROCEDURE — 97162 PT EVAL MOD COMPLEX 30 MIN: CPT
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
## 2024-03-18 ENCOUNTER — HOSPITAL ENCOUNTER (OUTPATIENT)
Facility: HOSPITAL | Age: 69
Setting detail: RECURRING SERIES
Discharge: HOME | End: 2024-03-21
Payer: MEDICARE

## 2024-03-18 PROCEDURE — 97110 THERAPEUTIC EXERCISES: CPT

## 2024-03-18 PROCEDURE — 97112 NEUROMUSCULAR REEDUCATION: CPT

## 2024-03-18 NOTE — PROGRESS NOTES
PHYSICAL THERAPY - DAILY TREATMENT NOTE    Patient Name: Demetria Shay    Date: 3/18/2024    : 1955  Insurance: Payor: [employer] / Plan: [employer](HMO) / Product Type: *No Product type* /      Patient  verified Yes     Visit #   Current / Total 2 18   Time   In / Out 1145 1233   Pain   In / Out 4-5 4   Subjective Functional Status/Changes: Pt reports she is getting MRI of her L/s at end of month     TREATMENT AREA =  Other low back pain [M54.59]    OBJECTIVE    Modalities Rationale:     decrease pain to improve patient's ability to progress to PLOF and address remaining functional goals.     min [] Estim Unattended, type/location:                                      []  w/ice    []  w/heat    min [] Estim Attended, type/location:                                     []  w/US     []  w/ice    []  w/heat    []  TENS insruct      min []  Mechanical Traction: type/lbs                   []  pro   []  sup   []  int   []  cont    []  before manual    []  after manual    min []  Ultrasound, settings/location:     10 min  unbill []  Ice     [x]  Heat    location/position: Spine in supine with wedge under LE    min []  Paraffin,  details:     min []  Vasopneumatic Device, press/temp:     min []  Whirlpool / Fluido:    If using vaso (only need to measure limb vaso being performed on)      pre-treatment girth :       post-treatment girth :       measured at (landmark location) :      min []  Other:    Skin assessment post-treatment:   Intact     Therapeutic Procedures:    Tx Min Billable or 1:1 Min (if diff from Tx Min) Procedure, Rationale, Specifics   10  38027 Therapeutic Exercise (timed):  increase ROM, strength, coordination, balance, and proprioception to improve patient's ability to progress to PLOF and address remaining functional goals. (see flow sheet as applicable)     Details if applicable:       28  29527 Neuromuscular Re-Education (timed):  improve balance, coordination, kinesthetic

## 2024-04-01 ENCOUNTER — TELEPHONE (OUTPATIENT)
Facility: HOSPITAL | Age: 69
End: 2024-04-01

## 2024-04-01 NOTE — TELEPHONE ENCOUNTER
Pt called to cxl due to not having transportation, car is still in the shop, wcb to sched after getting car back 2hrs>cxl

## 2024-05-15 ENCOUNTER — HOSPITAL ENCOUNTER (OUTPATIENT)
Facility: HOSPITAL | Age: 69
Setting detail: RECURRING SERIES
Discharge: HOME | End: 2024-05-18
Payer: MEDICARE

## 2024-05-15 PROCEDURE — 97161 PT EVAL LOW COMPLEX 20 MIN: CPT
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
## 2024-05-15 NOTE — THERAPY EVALUATION
[facility]  [address], [address], [place] [address] Ph: [phone] Fx: [phone]  Plan of Care / Statement of Necessity for Physical Therapy Services     Patient Name: Demetria Shay : 1955   Medical   Diagnosis: Other low back pain [M54.59]  Treatment Diagnosis:  M54.59, G89.29  CHRONIC LOWER BACK PAIN    Onset Date: 2004     Referral Source: Bib Mark MD Start of Care (SOC): 5/15/2024   Prior Hospitalization: See medical history Provider #: [id number]   Prior Level of Function: Indep comm amb, cares for her bedridden spouse, uses a cane for 3 years   Comorbidities: Cataracts, prior breast cancer, hearing aids     Assessment / key information:  Pt is a 69 year old female who presents with low back pain.  The patient reports she has radiating pain down her legs to mid thigh .  Pt ambulating with a cane x 3 years due to balance.  She has spasms intermittently and limited ROM and core strength impacting her quality of life.   Pt's rehab potential is good due to motivation.  Pt would benefit from skilled PT to address above deficits to improve pt's functional status and ability to return to PLOF with decreased pain and improved overall functional status.  Barriers to progress: financial and activity tolerance, she may not be able to rest as she is caring for her bedridden spouse.     Not post operative    Evaluation Complexity:  History:  LOW Complexity : Zero comorbidities / personal factors that will impact the outcome / POC; Examination:  LOW Complexity : 1-2 Standardized tests and measures addressing body structure, function, activity limitation and / or participation in recreation  ;Presentation:  LOW Complexity : Stable, uncomplicated  ;Clinical Decision Making: FOTO: 57 = LOW Complexity : FOTO score of   Overall Complexity Rating: LOW   Problem List: pain affecting function, decrease ROM, decrease strength, edema affection

## 2024-05-29 ENCOUNTER — HOSPITAL ENCOUNTER (OUTPATIENT)
Facility: HOSPITAL | Age: 69
Setting detail: RECURRING SERIES
Discharge: HOME | End: 2024-06-01
Payer: MEDICARE

## 2024-05-29 PROCEDURE — 97112 NEUROMUSCULAR REEDUCATION: CPT

## 2024-05-29 PROCEDURE — 97110 THERAPEUTIC EXERCISES: CPT

## 2024-05-29 PROCEDURE — 97530 THERAPEUTIC ACTIVITIES: CPT

## 2024-05-29 NOTE — PROGRESS NOTES
Status at EVAL: sleeps 2 hours then wakes due to pain   5/29/24: progressing, sleeping 3-4 hours a night         - Goal 2: Pt will  demo ability to work in her house for 3 hours at a time without pain  Status at EVAL: 30 minutes  5/29/24: progressing, can work for 30 minutes, then needs to sit and take a break    - Goal 3: Pt will be able to lift and carry a basket with 8 lbs for 50' without back pain  Status at EVAL: walks 10 feet with 5 lbs with pain 3/10      - Goal 4: Patient will increase FOTO score to at least > 61 pts to demonstrate increased functional mobility.  Status at EVAL: FOTO score at time of eval : 57        Next PN/ RC due 6/15/24  Auth due (visit number/ date) 8/2/24    PLAN  - Continue Plan of Care  - Upgrade activities as tolerated    Edward Garrison, PT    5/29/2024    12:25 PM    Future Appointments   Date Time Provider Department Center   5/29/2024  2:20 PM [facility]

## 2024-06-12 ENCOUNTER — HOSPITAL ENCOUNTER (OUTPATIENT)
Facility: HOSPITAL | Age: 69
Setting detail: RECURRING SERIES
Discharge: HOME | End: 2024-06-15
Payer: MEDICARE

## 2024-06-12 PROCEDURE — 97112 NEUROMUSCULAR REEDUCATION: CPT

## 2024-06-12 PROCEDURE — 97110 THERAPEUTIC EXERCISES: CPT

## 2024-06-12 PROCEDURE — 97530 THERAPEUTIC ACTIVITIES: CPT

## 2024-06-12 NOTE — PROGRESS NOTES
PHYSICAL / OCCUPATIONAL THERAPY - DAILY TREATMENT NOTE    Patient Name: Demetria Shay    Date: 2024    : 1955  Insurance: Payor: [employer] / Plan: [employer] VAL[employer] HMO / Product Type: *No Product type* /      Patient  verified Yes     Visit #   Current / Total 3 8-16   Time   In / Out 1:07 1:46   Pain   In / Out 5/10 5/10   Subjective Functional Status/Changes: I have been riding my bike      TREATMENT AREA =  Other low back pain [M54.59]     OBJECTIVE  Therapeutic Procedures:    Tx Min Billable or 1:1 Min (if diff from Tx Min) Procedure, Rationale, Specifics   9  20123 Therapeutic Exercise (timed):  increase ROM, strength, coordination, balance, and proprioception to improve patient's ability to progress to PLOF and address remaining functional goals. (see flow sheet as applicable)     Details if applicable:  per flowsheet     15  10243 Neuromuscular Re-Education (timed):  improve balance, coordination, kinesthetic sense, posture, core stability and proprioception to improve patient's ability to develop conscious control of individual muscles and awareness of position of extremities in order to progress to PLOF and address remaining functional goals. (see flow sheet as applicable)     Details if applicable:  per flowsheet , MAX CUEING for all stretches, and states she is doing at home but needed max cues.   Plank for 16 sec x 2 on elbows/forearms with no increase in pain, 4 reps.      15  58530 Therapeutic Activity (timed):  use of dynamic activities replicating functional movements to increase ROM, strength, coordination, balance, and proprioception in order to improve patient's ability to progress to PLOF and address remaining functional goals.  (see flow sheet as applicable)     Details if applicable:  carried 8# 50',improved, but pain 5/10 so aborted.    FOTO with mod A.   PN and goals reviewed, advised on positioning herself at night, and education on handling of her dependent . 
progress note:   57          Payor: [employer] / Plan: [employer] VALUE HMO / Product Type: *No Product type* /     Medicare, cannot change goals, cannot adjust frequency/duration, no signature required   Reporting Period: (date from last Prog Note/Eval to current Prog Note/Recert)  5/15/24 - 6/12/24    RECOMMENDATIONS  Patient would benefit from the continuation of skilled rehab interventions, per initial Plan of Care or most recent Medicare Recert, for functional progress to achieving above stated clinically significant goals.    If you have any questions/comments please contact us directly.  Thank you for allowing us to assist in the care of your patient.    Edward Garrison, PT       6/12/2024       1:27 PM

## 2024-06-20 ENCOUNTER — APPOINTMENT (OUTPATIENT)
Facility: HOSPITAL | Age: 69
End: 2024-06-20
Payer: MEDICARE

## 2024-06-27 ENCOUNTER — HOSPITAL ENCOUNTER (OUTPATIENT)
Facility: HOSPITAL | Age: 69
Setting detail: RECURRING SERIES
End: 2024-06-27
Payer: MEDICARE

## 2024-07-01 ENCOUNTER — HOSPITAL ENCOUNTER (OUTPATIENT)
Facility: HOSPITAL | Age: 69
Setting detail: RECURRING SERIES
Discharge: HOME | End: 2024-07-04
Payer: MEDICARE

## 2024-07-01 PROCEDURE — 97110 THERAPEUTIC EXERCISES: CPT

## 2024-07-01 PROCEDURE — 97530 THERAPEUTIC ACTIVITIES: CPT

## 2024-07-01 NOTE — PROGRESS NOTES
PHYSICAL / OCCUPATIONAL THERAPY - DAILY TREATMENT NOTE    Patient Name: Demetria Shay    Date: 2024    : 1955  Insurance: Payor: [employer] MEDICARE / Plan: [employer] VAL[employer] HMO / Product Type: *No Product type* /      Patient  verified Yes     Visit #   Current / Total 4 8-16   Time   In / Out 12:37 1:25   Pain   In / Out 4-5/10 4-5/10   Subjective Functional Status/Changes: Pt reports most difficulty still with bending and lifting. Used to go to yoga but has not been recently. Mentioned taking depression medication as well as muscle relaxers and tylenol. She is getting MRI in August.     TREATMENT AREA =  Other low back pain [M54.59]     OBJECTIVE    Therapeutic Procedures:    Tx Min Billable or 1:1 Min (if diff from Tx Min) Procedure, Rationale, Specifics   18 18 [id number] Therapeutic Activity (timed):  use of dynamic activities replicating functional movements to increase ROM, strength, coordination, balance, and proprioception in order to improve patient's ability to progress to PLOF and address remaining functional goals.  (see flow sheet as applicable)     Details if applicable:  Pt education on getting out of bed, body mechanics with lifting and squatting, consistency with PT.   30 30 01829 Therapeutic Exercise (timed):  increase ROM, strength, coordination, balance, and proprioception to improve patient's ability to progress to PLOF and address remaining functional goals. (see flow sheet as applicable)     Details if applicable:            Details if applicable:            Details if applicable:            Details if applicable:     48 48 [facility] Totals Reminder: bill using total billable min of TIMED therapeutic procedures (example: do not include dry needle or estim unattended, both untimed codes, in totals to left)  8-22 min = 1 unit; 23-37 min = 2 units; 38-52 min = 3 units; 53-67 min = 4 units; 68-82 min = 5 units   Total Total     [x]  Patient Education billed concurrently with other

## 2024-07-16 ENCOUNTER — APPOINTMENT (OUTPATIENT)
Facility: HOSPITAL | Age: 69
End: 2024-07-16
Payer: MEDICARE

## 2024-07-18 ENCOUNTER — HOSPITAL ENCOUNTER (OUTPATIENT)
Facility: HOSPITAL | Age: 69
Setting detail: RECURRING SERIES
Discharge: HOME | End: 2024-07-21
Payer: MEDICARE

## 2024-07-18 PROCEDURE — 97530 THERAPEUTIC ACTIVITIES: CPT

## 2024-07-18 PROCEDURE — 97110 THERAPEUTIC EXERCISES: CPT

## 2024-07-18 PROCEDURE — 97112 NEUROMUSCULAR REEDUCATION: CPT

## 2024-07-18 NOTE — PROGRESS NOTES
PHYSICAL / OCCUPATIONAL THERAPY - DAILY TREATMENT NOTE    Patient Name: Demetria Shay    Date: 2024    : 1955  Insurance: Payor: [employer] / Plan: [employer] / Product Type: *No Product type* /      Patient  verified Yes     Visit #   Current / Total 5 8-16   Time   In / Out 11:41 12:37   Pain   In / Out 5-6/10 5-6/10   Subjective Functional Status/Changes: Pt relates she is to have a MRI of her L-S area in August, maybe on the .  Pt reports continued L-S pain, exacerbated with bending/lifting tasks, some relief with rest, but also aggravated by sitting or lying down for too long.  Pt denies LE pain/sciatica.     TREATMENT AREA =  Other low back pain [M54.59]     OBJECTIVE    Therapeutic Procedures:    Tx Min Billable or 1:1 Min (if diff from Tx Min) Procedure, Rationale, Specifics   29 29 [id number] Therapeutic Exercise (timed):  increase ROM, strength, coordination, balance, and proprioception to improve patient's ability to progress to PLOF and address remaining functional goals. (see flow sheet as applicable)     Details if applicable:  Includes Reassessment.   12 12 [id number] Therapeutic Activity (timed):  use of dynamic activities replicating functional movements to increase ROM, strength, coordination, balance, and proprioception in order to improve patient's ability to progress to PLOF and address remaining functional goals.  (see flow sheet as applicable)     Details if applicable:  Squatting and bending training/performance.   15 15 [id number] Neuromuscular Re-Education (timed):  improve balance, coordination, kinesthetic sense, posture, core stability and proprioception to improve patient's ability to develop conscious control of individual muscles and awareness of position of extremities in order to progress to PLOF and address remaining functional goals. (see flow sheet as applicable)     Details if applicable:            Details if applicable:            Details if applicable:   
house for 3 hours at a time without pain  Status at EVAL: 30 minutes  Current:  still limited and increased pain with bending or lifting chores.     - Goal 3: Pt will be able to lift and carry a basket with 8 lbs for 50' without back pain  Status at EVAL: walks 10 feet with 5 lbs with pain 3/10   Current:  gets son to lift laundry basket and heavier groceries--increased LBP with bending and lifting.     - Goal 4: Patient will increase FOTO score to at least > 61 pts to demonstrate increased functional mobility.  Status at EVAL: 57.  Current:  57.    Payor: [employer] / Plan: [employer] / Product Type: *No Product type* /     Medicare, cannot change goals, cannot adjust frequency/duration, no signature required     Reporting Period: (date from last Prog Note/Eval to current Prog Note/Recert)  06/12/2024 - 07/18/2024    RECOMMENDATIONS:  Patient would benefit from the continuation of skilled rehab interventions, per initial Plan of Care or most recent Medicare Recert, for functional progress to achieving above stated clinically significant goals.  Continue PT for 1-2 more visits to finalize HEP and get MRI results.    If you have any questions/comments please contact us directly.  Thank you for allowing us to assist in the care of your patient.    Edgardo Miranda, PT       7/18/2024       1:51 PM

## 2024-08-01 ENCOUNTER — HOSPITAL ENCOUNTER (OUTPATIENT)
Facility: HOSPITAL | Age: 69
Setting detail: RECURRING SERIES
Discharge: HOME | End: 2024-08-04
Payer: MEDICARE

## 2024-08-01 PROCEDURE — 97112 NEUROMUSCULAR REEDUCATION: CPT

## 2024-08-01 PROCEDURE — 97110 THERAPEUTIC EXERCISES: CPT

## 2024-08-01 NOTE — PROGRESS NOTES
PHYSICAL / OCCUPATIONAL THERAPY - DAILY TREATMENT NOTE    Patient Name: Demetria Shay    Date: 2024    : 1955  Insurance: Payor: [employer] / Plan: [employer] / Product Type: *No Product type* /      Patient  verified Yes     Visit #   Current / Total 6 8-16   Time   In / Out 12:23 1:04   Pain   In / Out 5/10 5/10   Subjective Functional Status/Changes: Pt reports no better or worse after last PT session.  Pt c/o constant pain across bilat L-S area and relates due to arthritis and having low pain tolerance; worse with activity.  Pt to have xrays taken for her lower back tomorrow at 12:00 PM.     TREATMENT AREA =  Other low back pain [M54.59]     OBJECTIVE    Therapeutic Procedures:    Tx Min Billable or 1:1 Min (if diff from Tx Min) Procedure, Rationale, Specifics   26 26 [id number] Therapeutic Exercise (timed):  increase ROM, strength, coordination, balance, and proprioception to improve patient's ability to progress to PLOF and address remaining functional goals. (see flow sheet as applicable)     Details if applicable:     0 0 38732 Therapeutic Activity (timed):  use of dynamic activities replicating functional movements to increase ROM, strength, coordination, balance, and proprioception in order to improve patient's ability to progress to PLOF and address remaining functional goals.  (see flow sheet as applicable)     Details if applicable:     15 15 [id number] Neuromuscular Re-Education (timed):  improve balance, coordination, kinesthetic sense, posture, core stability and proprioception to improve patient's ability to develop conscious control of individual muscles and awareness of position of extremities in order to progress to PLOF and address remaining functional goals. (see flow sheet as applicable)     Details if applicable:            Details if applicable:            Details if applicable:     41 41 [facility] Totals Reminder: bill using total billable min of TIMED therapeutic

## 2024-08-01 NOTE — THERAPY RECERTIFICATION
[facility]  [address], [address], [place] [address] Ph: [phone] Fx: [phone]  CONTINUED PLAN OF CARE/RECERTIFICATION FOR PHYSICAL THERAPY          Patient Name: Demetria Shay : 1955   Treatment/Medical Diagnosis: Other low back pain [M54.59]   Onset Date: 2004    Referral Source: Bib Mark MD Start of Care (SOC): 05/15/2024   Prior Hospitalization: See Medical History Provider #: [id number]   Prior Level of Function:   Indep comm amb, cares for her bedridden spouse, uses a cane for 3 years      Comorbidities:   Cataracts, prior breast cancer, hearing aids      Visits from SOC: 6 Missed Visits: 1       SUMMARY OF TREATMENT:    Pt is a 69 year old female who has been attending PT at our clinic for chronic low back pain.   Attendance has been about every 2 weeks due to financial constraints with co-pay.  Pt is compliant with HEP and is the main caregiver of her  with MS, who is bed-bound.  Pt with a hx of radicular pain which goes to R mid thigh, posteriorly, has not had any improvement with this since starting PT--pain is constant and daily.  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   Pt has also had gait training and education on self-care strategies, HEP issued and progressed, manual therapy to address soft tissue restrictions and modalities for symptom modulation.      CURRENT STATUS:    Functional Gains: Pt reports no change/improvement  Functional Deficits: pain with bending tasks, gets son to help with lifting heavier items; pain sitting or lying down too long; limits walking to avoid increased pain; has to stretch before getting up from bed in the

## 2024-08-15 ENCOUNTER — HOSPITAL ENCOUNTER (OUTPATIENT)
Facility: HOSPITAL | Age: 69
Setting detail: RECURRING SERIES
Discharge: HOME | End: 2024-08-18
Payer: MEDICARE

## 2024-08-15 PROCEDURE — 97535 SELF CARE MNGMENT TRAINING: CPT

## 2024-08-15 NOTE — PROGRESS NOTES
siobhan ability to work in her house for 3 hours at a time without pain  Status at EVAL: 30 minutes  Current:  still limited and increased pain with bending or lifting chores.     - Goal 3: Pt will be able to lift and carry a basket with 8 lbs for 50' without back pain  Status at EVAL: walks 10 feet with 5 lbs with pain 3/10   Current:  Not discussed this visit, but patient generally reports no improvement in her condition.     - Goal 4: Patient will increase FOTO score to at least > 61 pts to demonstrate increased functional mobility.  Status at EVAL: 57.  Current:  Unchanged.    PLAN  - Discharge due to : lack of progress and limited compliance with attending PT sessions.    Edgardo Miranda PT    8/15/2024    9:32 AM  If an interpreting service was utilized for treatment of this patient, the contents of this document represent the material reviewed with the patient via the .     Future Appointments   Date Time Provider Department Center   8/15/2024 12:20 PM Edgardo Miranda, PT [facility]

## 2024-08-15 NOTE — THERAPY DISCHARGE
[facility]  [address], [address], [place] [address] Ph: [phone] Fx: [phone]   DISCHARGE SUMMARY  Patient Name: Demetria Shay : 1955   Treatment/Medical Diagnosis: Other low back pain [M54.59]   Referral Source: Bib Mark MD     Date of Initial Visit: 05/15/2024 Attended Visits: 7 Missed Visits: 1     SUMMARY OF TREATMENT:    Pt is a 69 year old female who has been attending PT at our clinic for chronic low back pain.   Attendance has been about every 2 weeks due to financial constraints with co-pay.  Pt is compliant with HEP and is the main caregiver of her  with MS, who is bed-bound.  Pt with a hx of radicular pain which goes to R mid thigh, posteriorly, has not had any improvement with this since starting PT--pain is constant and daily.  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   Pt has also had gait training and education on self-care strategies, HEP issued and progressed, manual therapy to address soft tissue restrictions and modalities for symptom modulation.    CURRENT STATUS:    Functional Gains: None  Functional Deficits: Continued constant lower back pain.  % improvement: 0%  Pain   Best: 2/10             Worst: 8/10    Short Term Goals: (To be accomplished in 4 weeks)  - Goal 1: Pt will be independent/compliant with HEP to improve ability to independently address symptoms and functional deficits.  Status at last note/certification: Established and reviewed with Pt, HEP issued at time of eval.   Current:  Patient reports doing some stretches at home.      -Goal 2:  Pt will be able to plank 20 seconds with out pain to demo improved core